# Patient Record
Sex: FEMALE | Race: ASIAN | NOT HISPANIC OR LATINO | URBAN - METROPOLITAN AREA
[De-identification: names, ages, dates, MRNs, and addresses within clinical notes are randomized per-mention and may not be internally consistent; named-entity substitution may affect disease eponyms.]

---
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
## 2021-05-31 VITALS — HEIGHT: 59 IN | WEIGHT: 98 LBS | BODY MASS INDEX: 19.76 KG/M2

## 2021-06-13 NOTE — PROGRESS NOTES
Subjective:    Debbie Yepez is a 17 y.o. female who is here for this well-child visit.  History was provided by the patient and mother.    No birth history on file.  There is no problem list on file for this patient.    No current outpatient prescriptions on file.  Immunization History   Administered Date(s) Administered     DTaP, historic 2001, 2001, 01/07/2002, 06/30/2003, 06/29/2005     HPV Quadrivalent 07/25/2013, 03/26/2015     Hep B / HiB 2001, 2001     Hep B, Peds, Historic 07/11/2002     Hepatitis A, Ped/adol 2 Dose 03/26/2015     HiB, historic 07/11/2002, 06/30/2003     IPV 2001, 2001, 01/07/2002, 06/29/2005     MMR 07/11/2002, 06/29/2005     Meningococcal Conjugate 07/25/2013     Pneumo Conj 7-V(before 2010) 2001, 2001, 01/07/2002, 06/30/2003     Tdap 07/25/2013     Varicella 07/11/2002, 07/11/2005, 07/25/2013     Past medical, surgical, family history updated as appropriate.    Current concerns: Contraception.  Patient would like something for birth control.  She is sexually active.  She was thinking she might want birth control pills, but would like to discuss all forms of birth control.  Her mother is here today for this discussion.    Currently menstruating? Yes, normal monthly periods, lasting 3-5 days  Sexually active? yes -1 partner    Review of Nutrition:  Current diet: Normal    Sleep Habits:   Normal, 10/11 p.m. to 6 AM    Social Screening:   Family Unit: Mom, dad, 5 kids  Parental relations: Normal  Sibling relations: 3 younger sisters, 1 younger brother    Secondhand smoke exposure? no    School: [employer] , Grade: 11th  School Concerns: None  Discipline concerns? no  Concerns regarding behavior with peers? no  School performance: doing well; no concerns    Sports/Exercise:  None, encouraged  Social Activities(recreation, hobbies, TV): play with friends and family  Peer Group (friends, dating, sexual activity) : has friends, dating one  "person  Work/Future Plans: pharmacist?    Screening Questions:  Risk factors for anemia: no  Risk factors for vision problems: no  Risk factors for hearing problems: no  Risk factors for sexually-transmitted infections: yes - reviewed safe sex practices  Risk factors for alcohol/drug use:  no      Hearing Screening    Method: Audiometry    125Hz 250Hz 500Hz 1000Hz 2000Hz 3000Hz 4000Hz 6000Hz 8000Hz   Right ear:   Pass Pass Pass  Pass     Left ear:   Pass Pass Pass  Pass        Visual Acuity Screening    Right eye Left eye Both eyes   Without correction:      With correction: 10/16 10/20        Objective:   Height:  4' 10.75\" (1.492 m)  Weight: 98 lb (44.5 kg)  Blood Pressure: 110/60  BMI: Body mass index is 19.96 kg/(m^2).  BSA: Body surface area is 1.36 meters squared.  Growth parameters are noted and are appropriate for age.    Vitals:    09/20/17 1347   BP: 110/60   Patient Site: Left Arm   Patient Position: Sitting   Cuff Size: Adult Regular   Pulse: 64   Resp: 20   Temp: 98.3  F (36.8  C)   TempSrc: Oral   Weight: 98 lb (44.5 kg)   Height: 4' 10.75\" (1.492 m)     General:  Alert  Head:  normocephalic  Eyes: PERRL/EOMI  ENT: Ears normal. TMs normal.  Normal oral pharynx.  Neck:  Normal, no masses  Cardiac: Regular without murmur  Pulmonary: Lungs clear bilaterally  Abdomen:  Soft, no masses or organomegaly noted.  Musculoskeletal:  Normal muscle tone and bulk, normal spine  Skin:  No rashes.  Warm and dry.  Neurologic:  Reflexes normal. Gross motor is normal.    Assessment and Plan:   1. Well adolescent and normal sports physical.   Anticipatory guidance discussed.  Gave handout on well-child issues at this age.  -Growth and development appropriate for age.  Foods to avoid, wear seat belt, working smoke detectors, gun storage safety, read books, limit t.v./computer/phone exposure, encourage exercise.  -Verbal referral given to dentist.  -Immunizations given today as ordered.  -Screening chlamydia ordered " today.  Follow-up visit in 1 year for next well child visit, or sooner as needed.  -Referrals: None.    2.  Contraception management.  All forms of contraception were discussed with patient and her mother.  Patient would like to try birth control patch.  Risks, benefits, possible side effects of patches were discussed with patient.  She is a non-smoker, no history of hypertension, no history of blood clots.  I reviewed proper use with her.  Prescription sent today.  She knows that she needs to use backup birth control for the first 2 weeks of use.

## 2021-06-16 PROBLEM — Z30.9 CONTRACEPTION MANAGEMENT: Status: ACTIVE | Noted: 2017-09-20
